# Patient Record
Sex: MALE | Race: WHITE | NOT HISPANIC OR LATINO | Employment: PART TIME | URBAN - METROPOLITAN AREA
[De-identification: names, ages, dates, MRNs, and addresses within clinical notes are randomized per-mention and may not be internally consistent; named-entity substitution may affect disease eponyms.]

---
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
## 2022-07-30 ENCOUNTER — OFFICE VISIT (OUTPATIENT)
Dept: URGENT CARE | Facility: CLINIC | Age: 22
End: 2022-07-30

## 2022-07-30 VITALS
DIASTOLIC BLOOD PRESSURE: 83 MMHG | OXYGEN SATURATION: 97 % | HEART RATE: 85 BPM | TEMPERATURE: 98.6 F | RESPIRATION RATE: 18 BRPM | SYSTOLIC BLOOD PRESSURE: 133 MMHG

## 2022-07-30 DIAGNOSIS — S61.411A LACERATION OF RIGHT HAND WITHOUT FOREIGN BODY, INITIAL ENCOUNTER: Primary | ICD-10-CM

## 2022-07-30 PROCEDURE — 99213 OFFICE O/P EST LOW 20 MIN: CPT | Performed by: PHYSICIAN ASSISTANT

## 2022-07-30 PROCEDURE — 12001 RPR S/N/AX/GEN/TRNK 2.5CM/<: CPT | Performed by: PHYSICIAN ASSISTANT

## 2022-07-30 RX ORDER — LIDOCAINE HYDROCHLORIDE 10 MG/ML
5 INJECTION, SOLUTION EPIDURAL; INFILTRATION; INTRACAUDAL; PERINEURAL ONCE
Status: COMPLETED | OUTPATIENT
Start: 2022-07-30 | End: 2022-07-30

## 2022-07-30 RX ORDER — ALBUTEROL SULFATE 0.63 MG/3ML
SOLUTION RESPIRATORY (INHALATION)
COMMUNITY

## 2022-07-30 RX ADMIN — LIDOCAINE HYDROCHLORIDE 5 ML: 10 INJECTION, SOLUTION EPIDURAL; INFILTRATION; INTRACAUDAL; PERINEURAL at 16:10

## 2022-07-30 NOTE — PROGRESS NOTES
[facility]        NAME: Roman García is a 25 y o  male  : 2000    MRN: [id number]  DATE: 2022  TIME: 10:04 AM    Assessment and Plan   Laceration of right hand without foreign body, initial encounter [S61 411A]  1  Laceration of right hand without foreign body, initial encounter  lidocaine (PF) (XYLOCAINE-MPF) 1 % injection 5 mL    Laceration repair         Patient Instructions     Keep wound clean, dry (do not submerge in water), and covered  Monitor wound for signs of infection  Continue with over-the-counter symptom relief as needed   Discussed need for Tdap booster - pt reports he does not usually get vaccines and declines Tdap at this time  Follow up with PCP in 3-5 days  Proceed to  ER if symptoms worsen  Chief Complaint     Chief Complaint   Patient presents with    Laceration     Patient c/o a laceration on his R hand from a staple about 15 minuties ago  History of Present Illness       Patient presents with complaint of laceration of the back of his right hand which occurred about 15 minutes ago  Patient states that he was cleaning out his desk when a staple in the desk cut the back of his hand  He reports cleaning the wound and coming directly to the clinic  He denies pain, paresthesias, and limited range of motion  He denies systemic symptoms including fever, chills, sweats  He denies history of wound infections  He is unsure when his last tetanus booster was & states that he does not typically get vaccines  Review of Systems   Review of Systems   Constitutional: Negative for chills and fever  HENT: Negative for ear pain and sore throat  Eyes: Negative for pain and visual disturbance  Respiratory: Negative for cough and shortness of breath  Cardiovascular: Negative for chest pain and palpitations  Gastrointestinal: Negative for abdominal pain and vomiting  Genitourinary: Negative for dysuria and hematuria     Musculoskeletal: Negative for arthralgias and back pain  Skin: Positive for wound  Negative for color change and rash  Neurological: Negative for seizures and syncope  All other systems reviewed and are negative  Current Medications       Current Outpatient Medications:     albuterol (ACCUNEB) 0 63 MG/3ML nebulizer solution, Inhale, Disp: , Rfl:     albuterol (PROVENTIL HFA,VENTOLIN HFA) 90 mcg/act inhaler, Inhale 2 puffs every 6 (six) hours as needed for wheezing, Disp: , Rfl:     fluticasone (FLOVENT HFA) 110 MCG/ACT inhaler, Inhale 2 puffs 2 (two) times a day Rinse mouth after use , Disp: , Rfl:     methocarbamol (ROBAXIN) 500 mg tablet, Take 1 tablet by mouth twice daily as needed for pain (Patient not taking: Reported on 9/25/2019), Disp: 10 tablet, Rfl: 0    predniSONE 10 mg tablet, Take 6 tablets today, 5 tomorrow, 4 the next day, 3 the next day, 2 the following and 1 the last day with food (Patient not taking: Reported on 9/25/2019), Disp: 21 tablet, Rfl: 0  No current facility-administered medications for this visit  Current Allergies     Allergies as of 07/30/2022 - Reviewed 09/25/2019   Allergen Reaction Noted    Amoxicillin Rash 10/12/2018    Penicillins Hives and Rash 01/06/2012    Strawberry extract - food allergy Rash 09/08/2014            The following portions of the patient's history were reviewed and updated as appropriate: allergies, current medications, past family history, past medical history, past social history, past surgical history and problem list      Past Medical History:   Diagnosis Date    Asthma        Past Surgical History:   Procedure Laterality Date    PECTUS EXCAVATUM REPAIR         Family History   Problem Relation Age of Onset    No Known Problems Mother     No Known Problems Father          Medications have been verified  Objective   /83   Pulse 85   Temp 98 6 °F (37 °C)   Resp 18   SpO2 97%   No LMP for male patient           Physical Exam     Physical Exam  Vitals and nursing note reviewed  Constitutional:       General: He is not in acute distress  Appearance: Normal appearance  He is well-developed  He is not ill-appearing or diaphoretic  HENT:      Head: Normocephalic and atraumatic  Eyes:      Conjunctiva/sclera: Conjunctivae normal       Pupils: Pupils are equal, round, and reactive to light  Cardiovascular:      Rate and Rhythm: Normal rate and regular rhythm  Heart sounds: Normal heart sounds  Pulmonary:      Effort: Pulmonary effort is normal  No respiratory distress  Breath sounds: Normal breath sounds  Musculoskeletal:         General: No swelling or tenderness  Normal range of motion  Cervical back: Normal range of motion and neck supple  Lymphadenopathy:      Cervical: No cervical adenopathy  Skin:     General: Skin is warm and dry  Capillary Refill: Capillary refill takes less than 2 seconds  Findings: No bruising, erythema or rash  Comments: 1 2 cm laceration of dorsum of right hand; no erythema, ecchymosis, or swelling; FAROM of digits; distal sensation intact   Neurological:      Mental Status: He is alert and oriented to person, place, and time  Cranial Nerves: No cranial nerve deficit  Sensory: No sensory deficit  Psychiatric:         Behavior: Behavior normal          Thought Content: Thought content normal        Laceration repair    Date/Time: 7/30/2022 4:04 PM  Performed by: Chester Pastor PA-C  Authorized by: Chester Pastor PA-C   Consent: Verbal consent obtained    Risks and benefits: risks, benefits and alternatives were discussed  Consent given by: patient  Patient understanding: patient states understanding of the procedure being performed  Patient consent: the patient's understanding of the procedure matches consent given  Procedure consent: procedure consent matches procedure scheduled  Required items: required blood products, implants, devices, and special equipment available  Patient identity confirmed: verbally with patient  Time out: Immediately prior to procedure a "time out" was called to verify the correct patient, procedure, equipment, support staff and site/side marked as required  Body area: upper extremity  Location details: right hand  Laceration length: 1 2 cm  Foreign bodies: no foreign bodies  Anesthesia: local infiltration    Anesthesia:  Local Anesthetic: lidocaine 1% without epinephrine  Anesthetic total: 1 mL      Procedure Details:  Preparation: Patient was prepped and draped in the usual sterile fashion  Irrigation solution: saline  Irrigation method: syringe  Amount of cleaning: standard  Debridement: none  Degree of undermining: minimal  Skin closure: 4-0 nylon  Number of sutures: 2  Technique: simple  Approximation: close  Approximation difficulty: simple  Dressing: non-adhesive packing strip  Patient tolerance: patient tolerated the procedure well with no immediate complications  Comments: Patient tolerated procedure well w/o complication  Discussed good wound care and monitoring for signs of infection

## 2022-08-10 ENCOUNTER — OFFICE VISIT (OUTPATIENT)
Dept: URGENT CARE | Facility: CLINIC | Age: 22
End: 2022-08-10
Payer: COMMERCIAL

## 2022-08-10 VITALS
HEART RATE: 84 BPM | WEIGHT: 175 LBS | OXYGEN SATURATION: 98 % | SYSTOLIC BLOOD PRESSURE: 112 MMHG | HEIGHT: 76 IN | TEMPERATURE: 97 F | DIASTOLIC BLOOD PRESSURE: 64 MMHG | BODY MASS INDEX: 21.31 KG/M2 | RESPIRATION RATE: 18 BRPM

## 2022-08-10 DIAGNOSIS — Z48.02 ENCOUNTER FOR REMOVAL OF SUTURES: Primary | ICD-10-CM

## 2022-08-10 PROCEDURE — 99213 OFFICE O/P EST LOW 20 MIN: CPT | Performed by: PHYSICIAN ASSISTANT

## 2022-08-10 NOTE — PATIENT INSTRUCTIONS
Stitches Removal   AMBULATORY CARE:   What you need to know about stitches removal:  Stitches are usually removed within 14 days, depending on the location of the wound  Your healthcare provider will tell you when to return to have your stitches removed  Your provider will use sterile forceps or tweezers to  the knot of each stitch  He or she will cut the stitch with scissors and pull the stitch out  You may feel a slight tug as the stitch comes out  Seek care immediately if:   Your wound splits open or is starting to come apart  You suddenly cannot move your injured joint  You have sudden numbness around your wound  You see red streaks coming from your wound  Contact your healthcare provider if:   You have a fever and chills  Your wound is red, warm, swollen, or leaking pus  There is a bad smell coming from your wound  You have increased pain in the wound area  You have questions or concerns about your condition or care  Care for the area after the stitches are removed:   Do not pull medical tape off  Your provider may place small strips of medical tape across your wound after the stitches have been removed  These strips will peel and fall of on their own  Do not pull them off  Clean the area as directed  Carefully wash the area with soap and water  Pat the area dry with a clean towel  Check the area for signs of infection, such as redness, swelling, or pus  Also check that the wound is not coming apart  Protect your wound  Your wound can swell, bleed, or split open if it is stretched or bumped  You may need to wear a bandage that supports your wound until it is completely healed  Care for a scar  You may have a scar after the stitches are removed  Use sunblock if the area is exposed to the sun  Apply it every day after the stitches are removed  This will help prevent skin discoloration   Talk to your healthcare provider about medicines you can use to make the scar less visible  Some medicines are available without a prescription  Follow up with your healthcare provider as directed: You may need to return in 3 to 5 days if the stitches are on your face  Stitches on your scalp need to be removed after 7 to 14 days  Stitches over joints may remain in place up to 14 days  Write down your questions so you remember to ask them during your visits  © Copyright [employer] 2022 Information is for End User's use only and may not be sold, redistributed or otherwise used for commercial purposes  All illustrations and images included in CareNotes® are the copyrighted property of [employer]  or [facility] Krystle Cuello   The above information is an  only  It is not intended as medical advice for individual conditions or treatments  Talk to your doctor, nurse or pharmacist before following any medical regimen to see if it is safe and effective for you

## 2022-08-10 NOTE — PROGRESS NOTES
Suture removal    Date/Time: 8/10/2022 3:58 PM  Performed by: Tanvir Bach PA-C  Authorized by: Tanvir Bach PA-C   Universal Protocol:  Consent: Verbal consent obtained  Risks and benefits: risks, benefits and alternatives were discussed  Consent given by: patient  Patient understanding: patient states understanding of the procedure being performed  Patient consent: the patient's understanding of the procedure matches consent given  Patient identity confirmed: verbally with patient        Patient location:  Clinic  Location:     Laterality:  Right    Location:  Upper extremity    Upper extremity location:  Hand    Hand location:  R hand  Procedure details: Tools used:  Suture removal kit    Wound appearance:  No sign(s) of infection, good wound healing and clean    Number of sutures removed:  2  Post-procedure details:     Post-removal:  Antibiotic ointment applied    Patient tolerance of procedure: Tolerated well, no immediate complications        [facility]        NAME: Justin Davis is a 25 y o  male  : 2000    MRN: [id number]  DATE: August 10, 2022  TIME: 3:58 PM    Assessment and Plan   Encounter for removal of sutures [Z48 02]  1  Encounter for removal of sutures           Patient Instructions       Follow up with PCP in 3-5 days  Proceed to  ER if symptoms worsen  Chief Complaint     Chief Complaint   Patient presents with    Suture / Staple Removal     Pt returns for suture removal on right hand  Placed 22         History of Present Illness       60-year-old male presents the clinic for suture removal that was placed on 2022  Patient states that he had 2 sutures placed to his right hand when he was getting something out of the desk and a staple in the desk scratched his hand  Patient denies any signs of infection, discharge, drainage, fevers, chills, redness, red streak        Review of Systems   Review of Systems   Constitutional: Negative for chills and fever    Respiratory: Negative for chest tightness, shortness of breath and wheezing  Cardiovascular: Negative for chest pain and palpitations  Gastrointestinal: Negative for nausea and vomiting  Musculoskeletal: Negative for arthralgias and myalgias  Skin: Positive for wound  Negative for rash  Neurological: Negative for dizziness, weakness, light-headedness, numbness and headaches  All other systems reviewed and are negative  Current Medications       Current Outpatient Medications:     albuterol (ACCUNEB) 0 63 MG/3ML nebulizer solution, Inhale (Patient not taking: Reported on 8/10/2022), Disp: , Rfl:     albuterol (PROVENTIL HFA,VENTOLIN HFA) 90 mcg/act inhaler, Inhale 2 puffs every 6 (six) hours as needed for wheezing (Patient not taking: Reported on 8/10/2022), Disp: , Rfl:     fluticasone (FLOVENT HFA) 110 MCG/ACT inhaler, Inhale 2 puffs 2 (two) times a day Rinse mouth after use   (Patient not taking: Reported on 8/10/2022), Disp: , Rfl:     methocarbamol (ROBAXIN) 500 mg tablet, Take 1 tablet by mouth twice daily as needed for pain (Patient not taking: No sig reported), Disp: 10 tablet, Rfl: 0    predniSONE 10 mg tablet, Take 6 tablets today, 5 tomorrow, 4 the next day, 3 the next day, 2 the following and 1 the last day with food (Patient not taking: No sig reported), Disp: 21 tablet, Rfl: 0    Current Allergies     Allergies as of 08/10/2022 - Reviewed 08/10/2022   Allergen Reaction Noted    Amoxicillin Rash 10/12/2018    Penicillins Hives and Rash 01/06/2012    Strawberry extract - food allergy Rash 09/08/2014            The following portions of the patient's history were reviewed and updated as appropriate: allergies, current medications, past family history, past medical history, past social history, past surgical history and problem list      Past Medical History:   Diagnosis Date    Asthma        Past Surgical History:   Procedure Laterality Date    PECTUS EXCAVATUM REPAIR Family History   Problem Relation Age of Onset    No Known Problems Mother     No Known Problems Father          Medications have been verified  Objective   /64   Pulse 84   Temp (!) 97 °F (36 1 °C)   Resp 18   Ht 6' 4" (1 93 m)   Wt 79 4 kg (175 lb)   SpO2 98%   BMI 21 30 kg/m²   No LMP for male patient  Physical Exam     Physical Exam  Vitals and nursing note reviewed  Constitutional:       General: He is not in acute distress  Appearance: He is well-developed  He is not diaphoretic  HENT:      Head: Normocephalic and atraumatic  Pulmonary:      Effort: Pulmonary effort is normal    Musculoskeletal:         General: Normal range of motion  Right hand: Laceration present  No swelling or tenderness  Normal range of motion  Normal strength  Normal sensation  There is no disruption of two-point discrimination  Normal capillary refill  Normal pulse  Comments: Small laceration to dorsal right hand with 2 sutures in place  No signs of infection  Sutures removed without difficulty, patient tolerated procedure well  Skin:     General: Skin is warm and dry  Neurological:      Mental Status: He is alert and oriented to person, place, and time

## 2023-07-02 ENCOUNTER — HOSPITAL ENCOUNTER (EMERGENCY)
Facility: HOSPITAL | Age: 23
Discharge: HOME | End: 2023-07-02
Attending: EMERGENCY MEDICINE | Admitting: EMERGENCY MEDICINE
Payer: COMMERCIAL

## 2023-07-02 ENCOUNTER — APPOINTMENT (EMERGENCY)
Dept: RADIOLOGY | Facility: HOSPITAL | Age: 23
End: 2023-07-02
Payer: COMMERCIAL

## 2023-07-02 VITALS
SYSTOLIC BLOOD PRESSURE: 152 MMHG | HEART RATE: 98 BPM | RESPIRATION RATE: 18 BRPM | TEMPERATURE: 98.1 F | DIASTOLIC BLOOD PRESSURE: 77 MMHG | OXYGEN SATURATION: 98 %

## 2023-07-02 DIAGNOSIS — S93.401A RIGHT ANKLE SPRAIN: Primary | ICD-10-CM

## 2023-07-02 PROCEDURE — 99283 EMERGENCY DEPT VISIT LOW MDM: CPT

## 2023-07-02 PROCEDURE — 73610 X-RAY EXAM OF ANKLE: CPT

## 2023-07-02 NOTE — DISCHARGE INSTRUCTIONS
RICE - rest, ice, compression (splint), elevation  Use aircast and crutches when ambulating. Can take off when resting.    Take ibuprofen or tylenol every 4-6 hours for pain   Schedule an appointment with the orthopedist   Return to the ER if you begin to develop intense pain in your foot that is worse or different than before, cant move toes or they turn numb, cold, blue, red hot, swollen, red streaking from leg, fevers, chest pain, shortness of breath

## 2023-07-03 NOTE — ED PROVIDER NOTES
History  Chief Complaint   Patient presents with   • Ankle Injury     Pt twisted right ankle playing basketball. Pt has previous broken fib. This is a 25 y/o male with no pertinent PMH who presents to the ER today with right ankle pain. Patient states he was playing basketball when he jumped up and then his ankle twisted inward. States it started to swell and bruise immediately. He has been able to walk on it but it has been painful. He states he took 3 ibuprofen prior to coming in which did help with the pain. He has not iced it yet. He does admit to a history of a fracture in this foot. Denies any numbness, tingling, decreased sensation, decreased ROM. History provided by:  Patient   used: No    Ankle Injury  Location:  Right  Severity:  Mild  Onset quality:  Sudden  Duration:  2 hours  Timing:  Constant  Chronicity:  New  Relieved by:  Ibuprofen      Prior to Admission Medications   Prescriptions Last Dose Informant Patient Reported? Taking? albuterol (ACCUNEB) 0.63 MG/3ML nebulizer solution   Yes No   Sig: Inhale   Patient not taking: Reported on 8/10/2022   albuterol (PROVENTIL HFA,VENTOLIN HFA) 90 mcg/act inhaler   Yes No   Sig: Inhale 2 puffs every 6 (six) hours as needed for wheezing   Patient not taking: Reported on 8/10/2022   fluticasone (FLOVENT HFA) 110 MCG/ACT inhaler   Yes No   Sig: Inhale 2 puffs 2 (two) times a day Rinse mouth after use.    Patient not taking: Reported on 8/10/2022   methocarbamol (ROBAXIN) 500 mg tablet   No No   Sig: Take 1 tablet by mouth twice daily as needed for pain   Patient not taking: No sig reported   predniSONE 10 mg tablet   No No   Sig: Take 6 tablets today, 5 tomorrow, 4 the next day, 3 the next day, 2 the following and 1 the last day with food   Patient not taking: No sig reported      Facility-Administered Medications: None       Past Medical History:   Diagnosis Date   • Asthma        Past Surgical History:   Procedure Laterality Date • PECTUS EXCAVATUM REPAIR         Family History   Problem Relation Age of Onset   • No Known Problems Mother    • No Known Problems Father      I have reviewed and agree with the history as documented. E-Cigarette/Vaping     E-Cigarette/Vaping Substances     Social History     Tobacco Use   • Smoking status: Never   • Smokeless tobacco: Never       Review of Systems   Musculoskeletal: Positive for arthralgias and joint swelling. Skin: Positive for color change. Neurological: Negative for weakness and numbness. Physical Exam  Physical Exam  Vitals and nursing note reviewed. Constitutional:       General: He is awake. Appearance: Normal appearance. He is well-developed. HENT:      Head: Normocephalic and atraumatic. Right Ear: External ear normal.      Left Ear: External ear normal.      Nose: Nose normal.   Eyes:      General: No scleral icterus. Extraocular Movements: Extraocular movements intact. Musculoskeletal:      Cervical back: Normal range of motion. Right ankle: Swelling (lateral malleolus) and ecchymosis (lateral malleolus) present. Tenderness present over the lateral malleolus. Normal range of motion. Left ankle: Normal.      Comments: NV and sensation intact. DP and PT pulses +2. Skin:     General: Skin is warm and dry. Neurological:      General: No focal deficit present. Mental Status: He is alert. Psychiatric:         Attention and Perception: Attention and perception normal.         Mood and Affect: Mood normal.         Behavior: Behavior normal. Behavior is cooperative.          Vital Signs  ED Triage Vitals [07/02/23 1848]   Temperature Pulse Respirations Blood Pressure SpO2   98.1 °F (36.7 °C) 98 18 152/77 98 %      Temp src Heart Rate Source Patient Position - Orthostatic VS BP Location FiO2 (%)   -- -- -- -- --      Pain Score       --           Vitals:    07/02/23 1848   BP: 152/77   Pulse: 98         Visual Acuity      ED Medications  Medications - No data to display    Diagnostic Studies  Results Reviewed     None                 XR ankle 3+ views RIGHT   ED Interpretation by Emanuel Blizzard, PA-C (07/02 1923)   No acute fracture/dislocation                  Procedures  Procedures         ED Course                               SBIRT 20yo+    Flowsheet Row Most Recent Value   Initial Alcohol Screen: US AUDIT-C     1. How often do you have a drink containing alcohol? 0 Filed at: 07/02/2023 1900   2. How many drinks containing alcohol do you have on a typical day you are drinking? 0 Filed at: 07/02/2023 1900   3a. Male UNDER 65: How often do you have five or more drinks on one occasion? 0 Filed at: 07/02/2023 1900   3b. FEMALE Any Age, or MALE 65+: How often do you have 4 or more drinks on one occassion? 0 Filed at: 07/02/2023 1900   Audit-C Score 0 Filed at: 07/02/2023 1900   LEONARDO: How many times in the past year have you. .. Used an illegal drug or used a prescription medication for non-medical reasons? Never Filed at: 07/02/2023 1900                    Medical Decision Making  25 y/o male here for right ankle pain after inversion injury  Xray ordered to r/o fracture  Assessment: right ankle sprain  Plan: xray read as negative by me for acute fracture. Placed patient in cam boot. Referral to ortho. Given care instructions. He  was given strict return to ER precautions both verbally and in discharge papers. Patient verbalized understanding and agrees with plan. Amount and/or Complexity of Data Reviewed  Radiology: ordered and independent interpretation performed.           Disposition  Final diagnoses:   Right ankle sprain     Time reflects when diagnosis was documented in both MDM as applicable and the Disposition within this note     Time User Action Codes Description Comment    7/2/2023  7:38 PM Alex Castillo Add [R75.292X] Right ankle sprain       ED Disposition     ED Disposition   Discharge    Condition   Stable Date/Time   Sun Jul 2, 2023  7:38 PM    Comment   Shoshana Patel discharge to home/self care.                Follow-up Information     Follow up With Specialties Details Why Contact Info Additional [address] [facility] [place] [facility] Schedule an appointment as soon as possible for a visit   Robbie Anna [place]io [phone]  [facility] [address] [facility], [address] [place], [place], [address] [facility]     [address] [facility] Emergency Department Emergency Medicine Go to  if you begin to develop intense pain in your foot that is worse or different than before, cant move toes or they turn numb, cold, blue, red hot, swollen, red streaking from leg, fevers, chest pain, shortness of breath [address] [facility] Emergency Department, [address] [facility], [address] [facility]          Discharge Medication List as of 7/2/2023  7:39 PM      CONTINUE these medications which have NOT CHANGED    Details   albuterol (ACCUNEB) 0.63 MG/3ML nebulizer solution Inhale, Historical Med      albuterol (PROVENTIL HFA,VENTOLIN HFA) 90 mcg/act inhaler Inhale 2 puffs every 6 (six) hours as needed for wheezing, Historical Med      fluticasone (FLOVENT HFA) 110 MCG/ACT inhaler Inhale 2 puffs 2 (two) times a day Rinse mouth after use., Historical Med      methocarbamol (ROBAXIN) 500 mg tablet Take 1 tablet by mouth twice daily as needed for pain, Normal      predniSONE 10 mg tablet Take 6 tablets today, 5 tomorrow, 4 the next day, 3 the next day, 2 the following and 1 the last day with food, Normal                 PDMP Review     None          ED Provider  Electronically Signed by           Altagracia Colindres PA-C  07/02/23 9314

## 2023-07-10 ENCOUNTER — APPOINTMENT (OUTPATIENT)
Dept: RADIOLOGY | Facility: CLINIC | Age: 23
End: 2023-07-10
Payer: COMMERCIAL

## 2023-07-10 ENCOUNTER — OFFICE VISIT (OUTPATIENT)
Dept: PODIATRY | Facility: CLINIC | Age: 23
End: 2023-07-10
Payer: COMMERCIAL

## 2023-07-10 VITALS
HEIGHT: 76 IN | SYSTOLIC BLOOD PRESSURE: 130 MMHG | HEART RATE: 85 BPM | WEIGHT: 175 LBS | DIASTOLIC BLOOD PRESSURE: 83 MMHG | BODY MASS INDEX: 21.31 KG/M2

## 2023-07-10 DIAGNOSIS — M79.671 PAIN OF RIGHT FOOT: ICD-10-CM

## 2023-07-10 DIAGNOSIS — S93.491A SPRAIN OF ANTERIOR TALOFIBULAR LIGAMENT OF RIGHT ANKLE, INITIAL ENCOUNTER: Primary | ICD-10-CM

## 2023-07-10 PROCEDURE — 73630 X-RAY EXAM OF FOOT: CPT

## 2023-07-10 PROCEDURE — 99202 OFFICE O/P NEW SF 15 MIN: CPT | Performed by: PODIATRIST

## 2023-07-14 NOTE — PROGRESS NOTES
Assessment/Plan:  Ice, rest, elevation, continue with cam boot for the next 3-4 weeks utilizing crutches partial weightbearing. Rx x-ray right foot to rule out any other further midfoot fractures personally reviewed. No obvious fracture dislocation noted, but if midfoot pain and swelling persists will consider CT scan  Follow-up in 6 weeks. No problem-specific Assessment & Plan notes found for this encounter. Diagnoses and all orders for this visit:    Sprain of anterior talofibular ligament of right ankle, initial encounter    Pain of right foot  -     X-ray foot right 3+ views; Future          Subjective:      Patient ID: Nano Roman is a 26 y.o. male. 42-year-old male sprained his right ankle last Sunday on 7/2/2023 playing basketball. History of previous right foot fibular fracture. Went to the ED and had x-rays which were read as negative for any fracture or dislocation. Reports she still has a fair amount of pain and swelling coursing down into his right lateral midfoot. The following portions of the patient's history were reviewed and updated as appropriate: allergies, current medications, past family history, past medical history, past social history, past surgical history and problem list.    Review of Systems   Constitutional: Negative. HENT: Negative. Eyes: Negative. Respiratory: Negative. Cardiovascular: Negative. Gastrointestinal: Negative. Endocrine: Negative. Genitourinary: Negative. Skin: Negative. Allergic/Immunologic: Negative. Neurological: Negative. Hematological: Negative. Psychiatric/Behavioral: Negative. Objective:      /83   Pulse 85   Ht 6' 4" (1.93 m)   Wt 79.4 kg (175 lb)   BMI 21.30 kg/m²          Physical Exam  Constitutional:       Appearance: Normal appearance. HENT:      Head: Normocephalic. Right Ear: Tympanic membrane normal.      Left Ear: Tympanic membrane normal.      Nose: No congestion. Mouth/Throat:      Pharynx: No oropharyngeal exudate or posterior oropharyngeal erythema. Eyes:      Conjunctiva/sclera: Conjunctivae normal.      Pupils: Pupils are equal, round, and reactive to light. Cardiovascular:      Rate and Rhythm: Normal rate and regular rhythm. Pulses: Normal pulses. Pulmonary:      Effort: Pulmonary effort is normal.   Musculoskeletal:         General: Swelling and tenderness (Lateral midfoot calcaneocuboid and fifth met base region.) present. Right ankle: Swelling present. Tenderness present over the ATF ligament and base of 5th metatarsal. Decreased range of motion. Left ankle: Normal.   Feet:      Right foot:      Protective Sensation: 10 sites tested. Left foot:      Protective Sensation: 10 sites tested. Skin:     General: Skin is warm and dry. Capillary Refill: Capillary refill takes 2 to 3 seconds. Coloration: Skin is not pale. Findings: No bruising, erythema, lesion or rash. Neurological:      Mental Status: He is alert. Cranial Nerves: No cranial nerve deficit. Sensory: No sensory deficit. Motor: No weakness.       Gait: Gait normal.      Deep Tendon Reflexes: Reflexes normal.   Psychiatric:         Mood and Affect: Mood normal.         Behavior: Behavior normal.         Judgment: Judgment normal.

## 2024-11-25 ENCOUNTER — OFFICE VISIT (OUTPATIENT)
Dept: URGENT CARE | Facility: CLINIC | Age: 24
End: 2024-11-25
Payer: COMMERCIAL

## 2024-11-25 VITALS
HEART RATE: 82 BPM | OXYGEN SATURATION: 98 % | RESPIRATION RATE: 18 BRPM | DIASTOLIC BLOOD PRESSURE: 70 MMHG | SYSTOLIC BLOOD PRESSURE: 148 MMHG | TEMPERATURE: 99.5 F

## 2024-11-25 DIAGNOSIS — J02.0 STREP PHARYNGITIS: Primary | ICD-10-CM

## 2024-11-25 LAB — S PYO AG THROAT QL: POSITIVE

## 2024-11-25 PROCEDURE — 87880 STREP A ASSAY W/OPTIC: CPT | Performed by: NURSE PRACTITIONER

## 2024-11-25 PROCEDURE — G0382 LEV 3 HOSP TYPE B ED VISIT: HCPCS | Performed by: NURSE PRACTITIONER

## 2024-11-25 PROCEDURE — S9083 URGENT CARE CENTER GLOBAL: HCPCS | Performed by: NURSE PRACTITIONER

## 2024-11-25 RX ORDER — AZITHROMYCIN 250 MG/1
TABLET, FILM COATED ORAL
Qty: 6 TABLET | Refills: 0 | Status: SHIPPED | OUTPATIENT
Start: 2024-11-25 | End: 2024-11-29

## 2024-11-25 NOTE — PROGRESS NOTES
[facility]        NAME: Marty Acosta is a 24 y.o. male  : 2000    MRN: [id number]  DATE: 2024  TIME: 5:57 PM    Assessment and Plan   Strep pharyngitis [J02.0]  1. Strep pharyngitis  POCT rapid strepA    azithromycin (ZITHROMAX) 250 mg tablet            Patient Instructions       Rapid strep is positive  Start antibiotics  Follow up with PCP in 3-5 days.  Proceed to  ER if symptoms worsen.    If tests have been performed at [facility], our office will contact you with results if changes need to be made to the care plan discussed with you at the visit.  You can review your full results on [facility].    Chief Complaint     Chief Complaint   Patient presents with    Sore Throat     Pt has had had a sore throat, body aches, chills x2 days.          History of Present Illness       HPI  Reports cold symptoms for the 3rd day now. Includes sweats, sore throat, bodyaches, mucus, and low grade temp.     Review of Systems   Review of Systems   Constitutional:  Positive for chills and fever (low grade; 99.9 at the highest).   HENT:  Positive for congestion, rhinorrhea and sore throat.    Respiratory:  Positive for cough. Negative for wheezing.    Cardiovascular:  Negative for chest pain.   Gastrointestinal:  Negative for abdominal pain, diarrhea and vomiting.   Musculoskeletal:  Positive for myalgias.         Current Medications       Current Outpatient Medications:     azithromycin (ZITHROMAX) 250 mg tablet, Take 2 tablets today then 1 tablet daily x 4 days, Disp: 6 tablet, Rfl: 0    albuterol (ACCUNEB) 0.63 MG/3ML nebulizer solution, Inhale (Patient not taking: Reported on 8/10/2022), Disp: , Rfl:     albuterol (PROVENTIL HFA,VENTOLIN HFA) 90 mcg/act inhaler, Inhale 2 puffs every 6 (six) hours as needed for wheezing (Patient not taking: Reported on 8/10/2022), Disp: , Rfl:     fluticasone (FLOVENT HFA) 110 MCG/ACT inhaler, Inhale 2 puffs 2 (two) times a day Rinse mouth after use. (Patient  not taking: Reported on 8/10/2022), Disp: , Rfl:     methocarbamol (ROBAXIN) 500 mg tablet, Take 1 tablet by mouth twice daily as needed for pain (Patient not taking: Reported on 9/25/2019), Disp: 10 tablet, Rfl: 0    predniSONE 10 mg tablet, Take 6 tablets today, 5 tomorrow, 4 the next day, 3 the next day, 2 the following and 1 the last day with food (Patient not taking: Reported on 9/25/2019), Disp: 21 tablet, Rfl: 0    Current Allergies     Allergies as of 11/25/2024 - Reviewed 11/25/2024   Allergen Reaction Noted    Amoxicillin Rash 10/12/2018    Penicillins Hives and Rash 01/06/2012    Strawberry extract - food allergy Rash 09/08/2014            The following portions of the patient's history were reviewed and updated as appropriate: allergies, current medications, past family history, past medical history, past social history, past surgical history and problem list.     Past Medical History:   Diagnosis Date    Asthma        Past Surgical History:   Procedure Laterality Date    PECTUS EXCAVATUM REPAIR         Family History   Problem Relation Age of Onset    No Known Problems Mother     No Known Problems Father          Medications have been verified.        Objective   /70   Pulse 82   Temp 99.5 °F (37.5 °C)   Resp 18   SpO2 98%   No LMP for male patient.       Physical Exam     Physical Exam  Constitutional:       General: He is not in acute distress.     Appearance: He is not ill-appearing.   HENT:      Head:      Comments: NTTP of the sinuses     Right Ear: Tympanic membrane normal.      Left Ear: Tympanic membrane normal.      Nose: Rhinorrhea present.      Comments: Right turbinate 3+. Left 2+     Mouth/Throat:      Pharynx: Posterior oropharyngeal erythema (moderate intensity) present.      Tonsils: No tonsillar exudate. 0 on the right. 0 on the left.   Pulmonary:      Breath sounds: Normal breath sounds.   Musculoskeletal:      Cervical back: Tenderness (with palpation of the anterior upper  portion of the neck) present.   Lymphadenopathy:      Cervical: No cervical adenopathy.

## 2025-06-13 ENCOUNTER — OFFICE VISIT (OUTPATIENT)
Dept: PODIATRY | Facility: CLINIC | Age: 25
End: 2025-06-13
Payer: COMMERCIAL

## 2025-06-13 VITALS — HEIGHT: 76 IN | BODY MASS INDEX: 22.65 KG/M2 | WEIGHT: 186 LBS

## 2025-06-13 DIAGNOSIS — M79.671 PAIN OF RIGHT FOOT: ICD-10-CM

## 2025-06-13 DIAGNOSIS — M79.672 PAIN IN LEFT FOOT: ICD-10-CM

## 2025-06-13 DIAGNOSIS — M72.2 PLANTAR FASCIITIS: Primary | ICD-10-CM

## 2025-06-13 PROCEDURE — 99213 OFFICE O/P EST LOW 20 MIN: CPT | Performed by: PODIATRIST

## 2025-06-16 NOTE — PROGRESS NOTES
":  Assessment & Plan  Plantar fasciitis  Pain of right foot  Pain in left foot  Overall pain is limited and minimal generalized to mid arch.  Good progress overall.  X-rays from 7/10/2023 personally reviewed.  Recommend initial trial of OTC orthotics prior to transitioning to custom.  Continue to stretch and do range of motion exercises daily  Follow-up as needed         History of Present Illness     Marty Acosta is a 25 y.o. male   6/13/2025: 25-year-old male reports right heel pain for several years could not walk on it a month ago and now it is not as bad averaging a 6 out of 10 pain level.  Has been through several modalities of treatment.    7/10/2023: 23-year-old male sprained his right ankle last Sunday on 7/2/2023 playing basketball.  History of previous right foot fibular fracture.  Went to the ED and had x-rays which were read as negative for any fracture or dislocation.  Reports she still has a fair amount of pain and swelling coursing down into his right lateral midfoot.      Review of Systems   Constitutional: Negative.    HENT: Negative.     Eyes: Negative.    Respiratory: Negative.     Cardiovascular: Negative.    Gastrointestinal: Negative.    Endocrine: Negative.    Genitourinary: Negative.    Musculoskeletal:         Generalized arch pain   Skin: Negative.    Allergic/Immunologic: Negative.    Neurological: Negative.    Hematological: Negative.    Psychiatric/Behavioral: Negative.       Objective   Ht 6' 4\" (1.93 m) Comment: stated  Wt 84.4 kg (186 lb)   BMI 22.64 kg/m²      Physical Exam  Vitals reviewed.   Constitutional:       General: He is not in acute distress.     Appearance: He is well-developed.   HENT:      Head: Normocephalic and atraumatic.      Nose: Nose normal.     Eyes:      Conjunctiva/sclera: Conjunctivae normal.       Cardiovascular:      Rate and Rhythm: Normal rate and regular rhythm.      Pulses: Normal pulses.   Pulmonary:      Effort: Pulmonary effort is normal. "     Musculoskeletal:         General: Tenderness present. No swelling or deformity.      Cervical back: Neck supple.      Comments: Mild dull ache arch pain bilateral roughly mid arch.  No edema erythema noted no pain with subtalar joint or ankle joint range of motion.  Pain has faded substantially.   Feet:      Right foot:      Protective Sensation: 10 sites tested.  10 sites sensed.      Left foot:      Protective Sensation: 10 sites tested.  10 sites sensed.     Skin:     General: Skin is warm and dry.      Capillary Refill: Capillary refill takes 2 to 3 seconds.     Neurological:      General: No focal deficit present.      Mental Status: He is alert and oriented to person, place, and time.     Psychiatric:         Mood and Affect: Mood normal.         And is only about a 6 out of 10 pain level.